# Patient Record
Sex: FEMALE | Race: WHITE | Employment: UNEMPLOYED | URBAN - METROPOLITAN AREA
[De-identification: names, ages, dates, MRNs, and addresses within clinical notes are randomized per-mention and may not be internally consistent; named-entity substitution may affect disease eponyms.]

---
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
## 2022-05-09 ENCOUNTER — HOSPITAL ENCOUNTER (EMERGENCY)
Age: 22
Discharge: HOME | End: 2022-05-10
Attending: EMERGENCY MEDICINE
Payer: COMMERCIAL

## 2022-05-09 DIAGNOSIS — R55 SYNCOPE AND COLLAPSE: Primary | ICD-10-CM

## 2022-05-09 LAB
ALBUMIN SERPL-MCNC: 4.2 G/DL (ref 3.5–5)
ALBUMIN/GLOB SERPL: 1.2 {RATIO} (ref 1.1–2.2)
ALP SERPL-CCNC: 89 U/L (ref 45–117)
ALT SERPL-CCNC: 21 U/L (ref 12–78)
ANION GAP SERPL CALC-SCNC: 6 MMOL/L (ref 5–15)
AST SERPL-CCNC: 15 U/L (ref 15–37)
BASOPHILS # BLD: 0.1 K/UL (ref 0–0.1)
BASOPHILS NFR BLD: 1 % (ref 0–1)
BILIRUB SERPL-MCNC: 0.3 MG/DL (ref 0.2–1)
BUN SERPL-MCNC: 9 MG/DL (ref 6–20)
BUN/CREAT SERPL: 11 (ref 12–20)
CALCIUM SERPL-MCNC: 9 MG/DL (ref 8.5–10.1)
CHLORIDE SERPL-SCNC: 105 MMOL/L (ref 97–108)
CO2 SERPL-SCNC: 29 MMOL/L (ref 21–32)
CREAT SERPL-MCNC: 0.84 MG/DL (ref 0.55–1.02)
DIFFERENTIAL METHOD BLD: NORMAL
EOSINOPHIL # BLD: 0 K/UL (ref 0–0.4)
EOSINOPHIL NFR BLD: 0 % (ref 0–7)
ERYTHROCYTE [DISTWIDTH] IN BLOOD BY AUTOMATED COUNT: 11.7 % (ref 11.5–14.5)
GLOBULIN SER CALC-MCNC: 3.4 G/DL (ref 2–4)
GLUCOSE SERPL-MCNC: 95 MG/DL (ref 65–100)
HCT VFR BLD AUTO: 37.4 % (ref 35–47)
HGB BLD-MCNC: 13.2 G/DL (ref 11.5–16)
IMM GRANULOCYTES # BLD AUTO: 0 K/UL (ref 0–0.04)
IMM GRANULOCYTES NFR BLD AUTO: 0 % (ref 0–0.5)
LYMPHOCYTES # BLD: 2.7 K/UL (ref 0.8–3.5)
LYMPHOCYTES NFR BLD: 27 % (ref 12–49)
MCH RBC QN AUTO: 31.1 PG (ref 26–34)
MCHC RBC AUTO-ENTMCNC: 35.3 G/DL (ref 30–36.5)
MCV RBC AUTO: 88 FL (ref 80–99)
MONOCYTES # BLD: 0.6 K/UL (ref 0–1)
MONOCYTES NFR BLD: 6 % (ref 5–13)
NEUTS SEG # BLD: 6.7 K/UL (ref 1.8–8)
NEUTS SEG NFR BLD: 66 % (ref 32–75)
NRBC # BLD: 0 K/UL (ref 0–0.01)
NRBC BLD-RTO: 0 PER 100 WBC
PLATELET # BLD AUTO: 229 K/UL (ref 150–400)
PMV BLD AUTO: 11.5 FL (ref 8.9–12.9)
POTASSIUM SERPL-SCNC: 3.5 MMOL/L (ref 3.5–5.1)
PROT SERPL-MCNC: 7.6 G/DL (ref 6.4–8.2)
RBC # BLD AUTO: 4.25 M/UL (ref 3.8–5.2)
SODIUM SERPL-SCNC: 140 MMOL/L (ref 136–145)
WBC # BLD AUTO: 10.2 K/UL (ref 3.6–11)

## 2022-05-09 PROCEDURE — 74011250636 HC RX REV CODE- 250/636: Performed by: EMERGENCY MEDICINE

## 2022-05-09 PROCEDURE — 85025 COMPLETE CBC W/AUTO DIFF WBC: CPT

## 2022-05-09 PROCEDURE — 36415 COLL VENOUS BLD VENIPUNCTURE: CPT

## 2022-05-09 PROCEDURE — 80053 COMPREHEN METABOLIC PANEL: CPT

## 2022-05-09 PROCEDURE — 93005 ELECTROCARDIOGRAM TRACING: CPT

## 2022-05-09 PROCEDURE — 99284 EMERGENCY DEPT VISIT MOD MDM: CPT

## 2022-05-09 RX ORDER — SODIUM CHLORIDE 0.9 % (FLUSH) 0.9 %
5-40 SYRINGE (ML) INJECTION AS NEEDED
Status: DISCONTINUED | OUTPATIENT
Start: 2022-05-09 | End: 2022-05-10 | Stop reason: HOSPADM

## 2022-05-09 RX ORDER — SODIUM CHLORIDE 0.9 % (FLUSH) 0.9 %
5-40 SYRINGE (ML) INJECTION EVERY 8 HOURS
Status: DISCONTINUED | OUTPATIENT
Start: 2022-05-09 | End: 2022-05-10 | Stop reason: HOSPADM

## 2022-05-09 RX ADMIN — SODIUM CHLORIDE 1000 ML: 9 INJECTION, SOLUTION INTRAVENOUS at 23:14

## 2022-05-10 VITALS
HEART RATE: 70 BPM | SYSTOLIC BLOOD PRESSURE: 95 MMHG | WEIGHT: 145 LBS | BODY MASS INDEX: 24.75 KG/M2 | DIASTOLIC BLOOD PRESSURE: 70 MMHG | HEIGHT: 64 IN | RESPIRATION RATE: 17 BRPM | TEMPERATURE: 97.5 F | OXYGEN SATURATION: 99 %

## 2022-05-10 LAB
ATRIAL RATE: 63 BPM
CALCULATED P AXIS, ECG09: 50 DEGREES
CALCULATED R AXIS, ECG10: 22 DEGREES
CALCULATED T AXIS, ECG11: 34 DEGREES
DIAGNOSIS, 93000: NORMAL
P-R INTERVAL, ECG05: 138 MS
Q-T INTERVAL, ECG07: 416 MS
QRS DURATION, ECG06: 88 MS
QTC CALCULATION (BEZET), ECG08: 425 MS
VENTRICULAR RATE, ECG03: 63 BPM

## 2022-05-10 NOTE — ED TRIAGE NOTES
Pt presents with parent who states that the pt has passed out 3 times over the past couple of hours. Pt has been playing softball. Pt states that she has eaten and drank fluids.

## 2022-05-10 NOTE — ED PROVIDER NOTES
History of ADHD. She presents accompanied by her parents after having 3 syncopal episodes this evening. No history of the same. The first episode occurred at the softball field when she was playing. As she was coming off the field, she began to feel lightheaded. She was able to sit down on the bench and had a short syncopal event that lasted a few seconds. She states that she then had a \"panic attack. \"  She was able to calm down and went home. She was able to go to [place] and then had a second event while she was sitting down. It also lasted a few seconds. Her parents decided to take her to a nearby rescue squad. They recommended that she come here to be evaluated. En route, she had a third episode (also while sitting and also lasted a few seconds). She states that she feels fine in between episodes. She ate lunch earlier but denies eating breakfast or dinner (she states this is not unusual). No past medical history on file. No past surgical history on file. No family history on file. Social History     Socioeconomic History    Marital status: SINGLE     Spouse name: Not on file    Number of children: Not on file    Years of education: Not on file    Highest education level: Not on file   Occupational History    Not on file   Tobacco Use    Smoking status: Not on file    Smokeless tobacco: Not on file   Substance and Sexual Activity    Alcohol use: Not on file    Drug use: Not on file    Sexual activity: Not on file   Other Topics Concern    Not on file   Social History Narrative    Not on file     Social Determinants of Health     Financial Resource Strain:     Difficulty of Paying Living Expenses: Not on file   Food Insecurity:     Worried About Running Out of Food in the Last Year: Not on file    Michelle of Food in the Last Year: Not on file   Transportation Needs:     Lack of Transportation (Medical): Not on file    Lack of Transportation (Non-Medical):  Not on file   Physical Activity:     Days of Exercise per Week: Not on file    Minutes of Exercise per Session: Not on file   Stress:     Feeling of Stress : Not on file   Social Connections:     Frequency of Communication with Friends and Family: Not on file    Frequency of Social Gatherings with Friends and Family: Not on file    Attends Hoahaoism Services: Not on file    Active Member of [address] [place] [address] or Organizations: Not on file    Attends Club or Organization Meetings: Not on file    Marital Status: Not on file   Intimate Partner Violence:     Fear of Current or Ex-Partner: Not on file    Emotionally Abused: Not on file    Physically Abused: Not on file    Sexually Abused: Not on file   Housing Stability:     Unable to Pay for Housing in the Last Year: Not on file    Number of [place] in the Last Year: Not on file    Unstable Housing in the Last Year: Not on file         ALLERGIES: Patient has no known allergies. Review of Systems   All other systems reviewed and are negative. Vitals:    05/09/22 2242   BP: 126/63   Pulse: 74   Resp: 14   Temp: 97.5 °F (36.4 °C)   SpO2: 99%   Weight: 65.8 kg (145 lb)   Height: 5' 4\" (1.626 m)            Physical Exam  Vitals and nursing note reviewed. Constitutional:       Appearance: She is well-developed. HENT:      Head: Normocephalic and atraumatic. Eyes:      Conjunctiva/sclera: Conjunctivae normal.   Neck:      Trachea: No tracheal deviation. Cardiovascular:      Rate and Rhythm: Normal rate and regular rhythm. Heart sounds: Normal heart sounds. No murmur heard. No friction rub. No gallop. Pulmonary:      Effort: Pulmonary effort is normal.      Breath sounds: Normal breath sounds. Abdominal:      Palpations: Abdomen is soft. Tenderness: There is no abdominal tenderness. Musculoskeletal:         General: No deformity. Cervical back: Neck supple. Skin:     General: Skin is warm and dry.    Neurological:      Mental Status: She is alert. Comments: oriented          MDM       Procedures    EKG: Normal sinus rhythm; rate of 63; normal ST, T.  Normal intervals. Aline Kennedy MD  11:18 PM    Progress Note:  Results, treatment, and follow up plan have been discussed with patient/mom. Questions were answered. Aline Kennedy MD  12:05 AM    Assessment/plan: Syncope x3 this evening. Suspect vasovagal episodes. Reassuring appearance/exam with stable vital signs. CBC, CMP, EKG okay. No abnormalities noted on her monitor. Home with PCP/cards follow-up. Return precautions discussed.   Aline Kennedy MD  12:07 AM

## 2022-08-23 ENCOUNTER — OFFICE VISIT (OUTPATIENT)
Dept: NEUROLOGY | Age: 22
End: 2022-08-23
Payer: COMMERCIAL

## 2022-08-23 VITALS
WEIGHT: 161.9 LBS | HEIGHT: 64 IN | HEART RATE: 91 BPM | OXYGEN SATURATION: 99 % | BODY MASS INDEX: 27.64 KG/M2 | SYSTOLIC BLOOD PRESSURE: 110 MMHG | DIASTOLIC BLOOD PRESSURE: 60 MMHG

## 2022-08-23 DIAGNOSIS — R55 SYNCOPE, UNSPECIFIED SYNCOPE TYPE: Primary | ICD-10-CM

## 2022-08-23 PROCEDURE — 99204 OFFICE O/P NEW MOD 45 MIN: CPT | Performed by: PSYCHIATRY & NEUROLOGY

## 2022-08-23 NOTE — PROGRESS NOTES
NEUROLOGY NEW PATIENT OFFICE CONSULTATION      8/23/2022    RE: Evelyne Cool         2000      REFERRED BY:  Parag Yang MD        CHIEF COMPLAINT:  This is Evelyne Cool is a 25 y.o. 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 HPI:     May 9, 2022, patient was playing softball and she felt dizzy and passed out. 40 mins later, it happened again. Admits to not eating and drinking well. (+) hyperventilation (+) stress with school and work. Has had 24 episodes of lightheadedness with 16/ 24 she passed out. Never happens when she is standing. Last episode was Aug 8, 2022, patient was lightheadedness all day at work, and around 5:45 apparently slumped in the car but woke up after several secs. (-) chest pain  (-) palpitations  (-) SOB  (-) shaking  (-) tongue bite  (-) incontinence  (-) hyper flexibility  (-) joint pain    Saw a cardiologist Dr Fernando Davis and heart monitor for 14 days was said to be okay. EEG at [facility] was said to be okay. ROS  (-) fever  (-) rash  All other systems reviewed and are negative    Past Medical Hx  No past medical history on file. ADHD  MIgraines - occurring 1 every 2 months. Social Hx  Social History     Socioeconomic History    Marital status: SINGLE   (-) smoking  (-) alcohol  (-) IVDA    Family Hx  No family history on file. Heart mother - grandmother    ALLERGIES  No Known Allergies    CURRENT MEDS  Current Outpatient Medications   Medication Sig Dispense Refill    SUMATRIPTAN SUCCINATE PO Take  by mouth. dextroamphetamine/amphetamine (ADDERALL PO) Take  by mouth. PREVIOUS WORKUP: (reviewed)  IMAGING:    CT Results (recent):  No results found for this or any previous visit. MRI Results (recent):  No results found for this or any previous visit. IR Results (recent):  No results found for this or any previous visit.       VAS/US Results (recent):  No results found for this or any previous visit. LABS (reviewed)  Results for orders placed or performed during the hospital encounter of 05/09/22   CBC WITH AUTOMATED DIFF   Result Value Ref Range    WBC 10.2 3.6 - 11.0 K/uL    RBC 4.25 3.80 - 5.20 M/uL    HGB 13.2 11.5 - 16.0 g/dL    HCT 37.4 35.0 - 47.0 %    MCV 88.0 80.0 - 99.0 FL    MCH 31.1 26.0 - 34.0 PG    MCHC 35.3 30.0 - 36.5 g/dL    RDW 11.7 11.5 - 14.5 %    PLATELET 655 877 - 385 K/uL    MPV 11.5 8.9 - 12.9 FL    NRBC 0.0 0.0  WBC    ABSOLUTE NRBC 0.00 0.00 - 0.01 K/uL    NEUTROPHILS 66 32 - 75 %    LYMPHOCYTES 27 12 - 49 %    MONOCYTES 6 5 - 13 %    EOSINOPHILS 0 0 - 7 %    BASOPHILS 1 0 - 1 %    IMMATURE GRANULOCYTES 0 0 - 0.5 %    ABS. NEUTROPHILS 6.7 1.8 - 8.0 K/UL    ABS. LYMPHOCYTES 2.7 0.8 - 3.5 K/UL    ABS. MONOCYTES 0.6 0.0 - 1.0 K/UL    ABS. EOSINOPHILS 0.0 0.0 - 0.4 K/UL    ABS. BASOPHILS 0.1 0.0 - 0.1 K/UL    ABS. IMM. GRANS. 0.0 0.00 - 0.04 K/UL    DF AUTOMATED     METABOLIC PANEL, COMPREHENSIVE   Result Value Ref Range    Sodium 140 136 - 145 mmol/L    Potassium 3.5 3.5 - 5.1 mmol/L    Chloride 105 97 - 108 mmol/L    CO2 29 21 - 32 mmol/L    Anion gap 6 5 - 15 mmol/L    Glucose 95 65 - 100 mg/dL    BUN 9 6 - 20 MG/DL    Creatinine 0.84 0.55 - 1.02 MG/DL    BUN/Creatinine ratio 11 (L) 12 - 20      GFR est AA >60 >60 ml/min/1.73m2    GFR est non-AA >60 >60 ml/min/1.73m2    Calcium 9.0 8.5 - 10.1 MG/DL    Bilirubin, total 0.3 0.2 - 1.0 MG/DL    ALT (SGPT) 21 12 - 78 U/L    AST (SGOT) 15 15 - 37 U/L    Alk.  phosphatase 89 45 - 117 U/L    Protein, total 7.6 6.4 - 8.2 g/dL    Albumin 4.2 3.5 - 5.0 g/dL    Globulin 3.4 2.0 - 4.0 g/dL    A-G Ratio 1.2 1.1 - 2.2     EKG, 12 LEAD, INITIAL   Result Value Ref Range    Ventricular Rate 63 BPM    Atrial Rate 63 BPM    P-R Interval 138 ms    QRS Duration 88 ms    Q-T Interval 416 ms    QTC Calculation (Bezet) 425 ms    Calculated P Axis 50 degrees    Calculated R Axis 22 degrees    Calculated T Axis 34 degrees    Diagnosis       Normal sinus rhythm  Normal ECG  No previous ECGs available  Confirmed by Kristel  ([id number]) on 5/10/2022 4:48:06 PM         Physical Exam:   Visit Vitals  /60   Pulse 91   Ht 5' 4\" (1.626 m)   Wt 73.4 kg (161 lb 14.4 oz)   SpO2 99%   BMI 27.79 kg/m²     General:  Alert, cooperative, no distress. Head:  Normocephalic, without obvious abnormality, atraumatic. Eyes:  Conjunctivae/corneas clear. Lungs:  Heart:   Non labored breathing  Regular rate and rhythm, no carotid bruits   Abdomen:   Soft, non-distended   Extremities: Extremities normal, atraumatic, no cyanosis or edema. Pulses: 2+ and symmetric all extremities. Skin: Skin color, texture, turgor normal. No rashes or lesions. Neurologic Exam     Gen: Attention normal             Language: naming, repetition, fluency normal             Memory: intact recent and remote memory  Cranial Nerves:  I: smell Not tested   II: visual fields Full to confrontation   II: pupils Equal, round, reactive to light   II: optic disc No papilledema   III,VII: ptosis none   III,IV,VI: extraocular muscles  Full ROM   V: mastication normal   V: facial light touch sensation  normal   VII: facial muscle function   symmetric   VIII: hearing symmetric   IX: soft palate elevation  normal   XI: trapezius strength  5/5   XI: sternocleidomastoid strength 5/5   XI: neck flexion strength  5/5   XII: tongue  midline     Motor: normal bulk and tone, no tremor              Strength: 5/5 all four extremities  Sensory: intact to LT, PP, vibration, and JPS  Reflexes: 2+ throughout; Down going toes  Coordination: Good FTN and HTS  Gait: normal gait including tandem            Impression:     Galina Leal is a 25 y.o. female who  has no past medical history on file. who on   May 9, 2022, was playing softball and she felt dizzy and passed out. 40 mins later, it happened again. Admits to not eating and drinking well.  (+) hyperventilation (+) stress with school (was taking Chemistry exam) and work. Has had 24 episodes of lightheadedness with 16/ 24 she passed out. Never happens when she is standing. Last episode was Aug 8, 2022, patient was lightheadedness all day at work, and around 5:45 apparently slumped in the car but woke up after several secs. Saw a cardiologist Dr Isra Painter and heart monitor for 14 days was said to be okay. EEG at [facility] was said to be okay. Considerations include syncope due to dehydration, lack of salt and anxiety/stress related. Patient should be evaluated for dysautonomia. RECOMMENDATIONS  1. I had a long discussion with patient and mother. Discussed diagnosis, prognosis, pathophysiology and available treatment. Reviewed test results. All questions were answered. 2. Will refer for ANS testing  3. Advise to keep hydrated and add salt in diet  4. Consider anti-anxiety medication c/o PCP if above is negative      Follow-up and Dispositions    Return for review of results.             Thank you for the consultation      Krish Elkins MD  Diplomate, American Board of Psychiatry and Neurology  Diplomate, Neuromuscular Medicine  Diplomate, American Board of Electrodiagnostic Medicine        CC: Jeff Aggarwal MD  Fax: [phone]

## 2022-08-23 NOTE — LETTER
8/23/2022    Patient: Gerry Sauceda   YOB: 2000   Date of Visit: 8/23/2022     [address]  [facility] [phone]  Via Fax: [phone]    Dear Lianna Merida MD,      Thank you for referring Ms. Gerry Sauceda to [facility] for evaluation. My notes for this consultation are attached. If you have questions, please do not hesitate to call me. I look forward to following your patient along with you.       Sincerely,    Tonya Leal MD

## 2022-09-06 ENCOUNTER — OFFICE VISIT (OUTPATIENT)
Dept: NEUROLOGY | Age: 22
End: 2022-09-06
Payer: COMMERCIAL

## 2022-09-06 DIAGNOSIS — R55 SYNCOPE, UNSPECIFIED SYNCOPE TYPE: Primary | ICD-10-CM

## 2022-09-06 PROCEDURE — 95924 ANS PARASYMP & SYMP W/TILT: CPT | Performed by: PSYCHIATRY & NEUROLOGY

## 2022-09-08 NOTE — PROCEDURES
[facility]  [address] [place], [address] [place], [address] [facility]  Phone: [phone]  FAX: [phone]     Clinical Autonomic Testing Report     Patient ID:  Flor   [id number]  11 y.o.  2000     REFERRED BY: PCP  PCP: Carl Cruz MD    Date of Testin2022      Indication/History:    Flor  is a 25 y.o. female who  has no past medical history on file. who on   May 9, 2022, was playing softball and she felt dizzy and passed out. 40 mins later, it happened again. Admits to not eating and drinking well. (+) hyperventilation (+) stress with school (was taking Chemistry exam) and work. Has had 24 episodes of lightheadedness with 16/ 24 she passed out. Never happens when she is standing. Last episode was Aug 8, 2022, patient was lightheadedness all day at work, and around 5:45 apparently slumped in the car but woke up after several secs. Saw a cardiologist Dr Curtis Slater and heart monitor for 14 days was said to be okay. EEG at [facility] was said to be okay. Patient is coming for syncope/autonomic dysfunction evaluation. Medications taken 48 hrs before the test: Adderall     Procedure: This Autonomic Nervous System (ANS) testing is performed by utilizing [address] [place] [address] [employer] Autonomic System, with established protocol. Multiple procedures performed: Heart rate response to deep breathing (HRDB), Valsalva ratio, Heart rate and BP response to head up tilt (HUT). Her insurance did not approve Quantitative sudomotor axon reflex testing (QSWEAT) . Result:  Heart response to deep  breathing (HRDB): 2 series of 6 cycles were performed and the mean of 6 consecutive cycles was obtained. Average HR difference was 23.05 and E:I ratio was 1.44. Normal response. Heart rate response to Valsalva maneuver:  [id number] BP to Valsalva was measured and BP response in all 4 phases was normal. Heart response was the opposite of BP, a normal response.  ( VR = 2.6 )  HUT (head-up tilt) : [id number] BP and HR were measured, up to 15 minutes post tilt. There is a delayed fall of blood pressure from baseline BP of 104/60 to lowest BP of 70/50 at 9 mins post tilt associated with lightheadedness and increased heart rate. Impression:   ABNORMAL    There is a delayed (after 3 mins of tilt) fall of blood pressure (>30/15 mmHg) with modest increase in heart rate, which can be seen in non-neurogenic cause of orthostatic hypotension ( i.e hypovolemia/ deconditioning).        Renee Ghotra MD  Diplomate, American Board of Psychiatry and Neurology  Diplomate, Neuromuscular Medicine  Diplomate, American Board of Electrodiagnostic Medicine    Note: Raw Data will be scanned separately in Media

## 2022-09-12 ENCOUNTER — TELEPHONE (OUTPATIENT)
Dept: NEUROLOGY | Age: 22
End: 2022-09-12

## 2022-09-12 NOTE — TELEPHONE ENCOUNTER
----- Message from José Manuel De Jesus MD sent at 9/8/2022  1:02 PM EDT -----  Schedule follow up to discuss results  EF

## 2022-09-16 NOTE — PROGRESS NOTES
Neurology Progress Note    Patient ID:  Brian Ignacio  [id number]  23 y.o.  2000      Subjective:   History:  Brian Ignacio is a 25 y.o. female who  has no past medical history on file. who on   May 9, 2022, was playing softball and she felt dizzy and passed out. 40 mins later, it happened again. Admits to not eating and drinking well. (+) hyperventilation (+) stress with school (was taking Chemistry exam) and work. Has had 24 episodes of lightheadedness with 16/ 24 she passed out. Never happens when she is standing. Last episode was Aug 8, 2022, patient was lightheadedness all day at work, and around 5:45 apparently slumped in the car but woke up after several secs. Saw a cardiologist Dr Rodrigo Reed and heart monitor for 14 days was said to be okay. EEG at [facility] was said to be okay. Patient had another spell yesterday. ANS testing (9/6/22)  ABNORMAL     There is a delayed (after 3 mins of tilt) fall of blood pressure (>30/15 mmHg) with modest increase in heart rate, which can be seen in non-neurogenic cause of orthostatic hypotension ( i.e hypovolemia/ deconditioning). ROS:  Per HPI-  Otherwise the remainder of ROS was negative    Social Hx  Social History     Socioeconomic History    Marital status: SINGLE       Meds:  Current Outpatient Medications on File Prior to Visit   Medication Sig Dispense Refill    SUMATRIPTAN SUCCINATE PO Take  by mouth. dextroamphetamine/amphetamine (ADDERALL PO) Take  by mouth. No current facility-administered medications on file prior to visit. Imaging:    CT Results (recent):  No results found for this or any previous visit. MRI Results (recent):  No results found for this or any previous visit. IR Results (recent):  No results found for this or any previous visit. VAS/US Results (recent):  No results found for this or any previous visit.       Reviewed records in [employer] and [facility] tab today    Lab Review     No visits with results within 3 Month(s) from this visit. Latest known visit with results is:   Admission on 05/09/2022, Discharged on 05/10/2022   Component Date Value Ref Range Status    WBC 05/09/2022 10.2  3.6 - 11.0 K/uL Final    RBC 05/09/2022 4.25  3.80 - 5.20 M/uL Final    HGB 05/09/2022 13.2  11.5 - 16.0 g/dL Final    HCT 05/09/2022 37.4  35.0 - 47.0 % Final    MCV 05/09/2022 88.0  80.0 - 99.0 FL Final    MCH 05/09/2022 31.1  26.0 - 34.0 PG Final    MCHC 05/09/2022 35.3  30.0 - 36.5 g/dL Final    RDW 05/09/2022 11.7  11.5 - 14.5 % Final    PLATELET 87/64/7522 875  150 - 400 K/uL Final    MPV 05/09/2022 11.5  8.9 - 12.9 FL Final    NRBC 05/09/2022 0.0  0.0  WBC Final    ABSOLUTE NRBC 05/09/2022 0.00  0.00 - 0.01 K/uL Final    NEUTROPHILS 05/09/2022 66  32 - 75 % Final    LYMPHOCYTES 05/09/2022 27  12 - 49 % Final    MONOCYTES 05/09/2022 6  5 - 13 % Final    EOSINOPHILS 05/09/2022 0  0 - 7 % Final    BASOPHILS 05/09/2022 1  0 - 1 % Final    IMMATURE GRANULOCYTES 05/09/2022 0  0 - 0.5 % Final    ABS. NEUTROPHILS 05/09/2022 6.7  1.8 - 8.0 K/UL Final    ABS. LYMPHOCYTES 05/09/2022 2.7  0.8 - 3.5 K/UL Final    ABS. MONOCYTES 05/09/2022 0.6  0.0 - 1.0 K/UL Final    ABS. EOSINOPHILS 05/09/2022 0.0  0.0 - 0.4 K/UL Final    ABS. BASOPHILS 05/09/2022 0.1  0.0 - 0.1 K/UL Final    ABS. IMM.  GRANS. 05/09/2022 0.0  0.00 - 0.04 K/UL Final    DF 05/09/2022 AUTOMATED    Final    Sodium 05/09/2022 140  136 - 145 mmol/L Final    Potassium 05/09/2022 3.5  3.5 - 5.1 mmol/L Final    Chloride 05/09/2022 105  97 - 108 mmol/L Final    CO2 05/09/2022 29  21 - 32 mmol/L Final    Anion gap 05/09/2022 6  5 - 15 mmol/L Final    Glucose 05/09/2022 95  65 - 100 mg/dL Final    BUN 05/09/2022 9  6 - 20 MG/DL Final    Creatinine 05/09/2022 0.84  0.55 - 1.02 MG/DL Final    BUN/Creatinine ratio 05/09/2022 11 (A) 12 - 20   Final    GFR est AA 05/09/2022 >60  >60 ml/min/1.73m2 Final    GFR est non-AA 05/09/2022 >60  >60 ml/min/1.73m2 Final Estimated GFR is calculated using the IDMS-traceable Modification of Diet in Renal Disease (MDRD) Study equation, reported for both  Americans (GFRAA) and non- Americans (GFRNA), and normalized to 1.73m2 body surface area. The physician must decide which value applies to the patient. Calcium 05/09/2022 9.0  8.5 - 10.1 MG/DL Final    Bilirubin, total 05/09/2022 0.3  0.2 - 1.0 MG/DL Final    ALT (SGPT) 05/09/2022 21  12 - 78 U/L Final    AST (SGOT) 05/09/2022 15  15 - 37 U/L Final    Alk. phosphatase 05/09/2022 89  45 - 117 U/L Final    Protein, total 05/09/2022 7.6  6.4 - 8.2 g/dL Final    Albumin 05/09/2022 4.2  3.5 - 5.0 g/dL Final    Globulin 05/09/2022 3.4  2.0 - 4.0 g/dL Final    A-G Ratio 05/09/2022 1.2  1.1 - 2.2   Final    Ventricular Rate 05/09/2022 63  BPM Final    Atrial Rate 05/09/2022 63  BPM Final    P-R Interval 05/09/2022 138  ms Final    QRS Duration 05/09/2022 88  ms Final    Q-T Interval 05/09/2022 416  ms Final    QTC Calculation (Bezet) 05/09/2022 425  ms Final    Calculated P Axis 05/09/2022 50  degrees Final    Calculated R Axis 05/09/2022 22  degrees Final    Calculated T Axis 05/09/2022 34  degrees Final    Diagnosis 05/09/2022    Final                    Value:Normal sinus rhythm  Normal ECG  No previous ECGs available  Confirmed by Erin Da Silva ([id number]) on 5/10/2022 4:48:06 PM           Objective:       Exam:  Visit Vitals  /80   Pulse 67   Ht 5' 4\" (1.626 m)   SpO2 100%   BMI 27.79 kg/m²     Gen: Awake, alert, follows commands  Appropriate appearance, normal speech. Oriented to all spheres. No visual field defect on confrontation exam.  Full eyes movement, with no nystagmus, no diplopia, no ptosis. Normal gag and swallow. All remaining cranial nerves were normal  Motor function: 5/5 in all extremities  Sensory: intact to LT, PP and JPS  DTRs ++ in all extremities, (-) Babinski  Good FTN and HTS   Gait: Normal    Assessment:       ICD-10-CM ICD-9-CM    1. Hypotension due to hypovolemia  I95.89 458.8     E86.1 276.52           Non-neurogenic cause of orthostatic hypotension ( i.e hypovolemia/ deconditioning). Considerations include syncope due to dehydration, lack of salt and anxiety/stress related. Plan:   1.  I had a long discussion with patient and mother. Discussed diagnosis, prognosis, pathophysiology and available treatment. Reviewed test results. All questions were answered. 2. Reviewed ANS testing  3. Went over with patient a list of things to do to prevent an event ( Drink 2-2.5 L fluids/day, increase salt intake 3-5 g per day, avoid alcohol and large meals, perform lower extremity exercises, on bad days drink 500 cc water quickly, waist high elastic support stockings, physical counter maneuvers)  4. Will consider medication to raise BP (ie. Midodrine) if still no improvement despite above         Follow-up and Dispositions    Return if symptoms worsen or fail to improve.          Anthony Carlton MD  Diplomate, American Board of Psychiatry and Neurology  Diplomate, Neuromuscular Medicine  Diplomate, American Board of Electrodiagnostic Medicine

## 2022-09-19 ENCOUNTER — OFFICE VISIT (OUTPATIENT)
Dept: NEUROLOGY | Age: 22
End: 2022-09-19
Payer: COMMERCIAL

## 2022-09-19 VITALS
HEART RATE: 67 BPM | DIASTOLIC BLOOD PRESSURE: 80 MMHG | BODY MASS INDEX: 27.79 KG/M2 | HEIGHT: 64 IN | OXYGEN SATURATION: 100 % | SYSTOLIC BLOOD PRESSURE: 130 MMHG

## 2022-09-19 DIAGNOSIS — I95.89 HYPOTENSION DUE TO HYPOVOLEMIA: Primary | ICD-10-CM

## 2022-09-19 DIAGNOSIS — E86.1 HYPOTENSION DUE TO HYPOVOLEMIA: Primary | ICD-10-CM

## 2022-09-19 PROCEDURE — 99214 OFFICE O/P EST MOD 30 MIN: CPT | Performed by: PSYCHIATRY & NEUROLOGY

## 2022-09-19 NOTE — LETTER
9/19/2022    Patient: Rachid Osorio   YOB: 2000   Date of Visit: 9/19/2022     [address]  [facility] [phone]  Via Fax: [phone]    Dear Leroy Johnson MD,      Thank you for referring Ms. Rachid Osorio to [facility] for evaluation. My notes for this consultation are attached. If you have questions, please do not hesitate to call me. I look forward to following your patient along with you.       Sincerely,    Katrina Greenwood MD